# Patient Record
Sex: MALE | Race: ASIAN | NOT HISPANIC OR LATINO
[De-identification: names, ages, dates, MRNs, and addresses within clinical notes are randomized per-mention and may not be internally consistent; named-entity substitution may affect disease eponyms.]

---

## 2020-07-20 ENCOUNTER — APPOINTMENT (OUTPATIENT)
Dept: DERMATOLOGY | Facility: CLINIC | Age: 30
End: 2020-07-20

## 2020-07-20 PROBLEM — Z00.00 ENCOUNTER FOR PREVENTIVE HEALTH EXAMINATION: Status: ACTIVE | Noted: 2020-07-20

## 2020-11-05 ENCOUNTER — APPOINTMENT (OUTPATIENT)
Dept: PEDIATRIC ALLERGY IMMUNOLOGY | Facility: CLINIC | Age: 30
End: 2020-11-05
Payer: COMMERCIAL

## 2020-11-05 VITALS
DIASTOLIC BLOOD PRESSURE: 82 MMHG | SYSTOLIC BLOOD PRESSURE: 124 MMHG | HEIGHT: 69 IN | HEART RATE: 95 BPM | OXYGEN SATURATION: 98 % | WEIGHT: 182 LBS | BODY MASS INDEX: 26.96 KG/M2 | TEMPERATURE: 97.6 F

## 2020-11-05 DIAGNOSIS — R09.82 POSTNASAL DRIP: ICD-10-CM

## 2020-11-05 DIAGNOSIS — H10.10 ACUTE ATOPIC CONJUNCTIVITIS, UNSPECIFIED EYE: ICD-10-CM

## 2020-11-05 PROCEDURE — 99204 OFFICE O/P NEW MOD 45 MIN: CPT | Mod: 25

## 2020-11-05 PROCEDURE — 95004 PERQ TESTS W/ALRGNC XTRCS: CPT

## 2020-11-05 RX ORDER — AZELASTINE HYDROCHLORIDE 137 UG/1
0.1 SPRAY, METERED NASAL TWICE DAILY
Qty: 1 | Refills: 3 | Status: ACTIVE | COMMUNITY
Start: 2020-11-05 | End: 1900-01-01

## 2020-11-05 RX ORDER — FLUTICASONE PROPIONATE 50 UG/1
50 SPRAY, METERED NASAL
Qty: 1 | Refills: 1 | Status: ACTIVE | COMMUNITY
Start: 2020-11-05 | End: 1900-01-01

## 2020-11-06 PROBLEM — H10.10 ALLERGIC CONJUNCTIVITIS: Status: ACTIVE | Noted: 2020-11-06

## 2020-11-06 NOTE — HISTORY OF PRESENT ILLNESS
[de-identified] : 29 year old male presents with chronic rhinitis, postnasal drips, sometime itching of the eyes:\par \par Patient complaints of nasal congestion, rhinorrhea, sneezing, postnasal drip, itching of the throat and itching inside his ears for one year. During the summer season also itching of the eyes. He has tried Fluticasone and  Allegra with limited and temporary relief of symptoms. Report h/o drowsiness with Cetirizine.

## 2020-11-06 NOTE — PHYSICAL EXAM
[Alert] : alert [Well Nourished] : well nourished [Healthy Appearance] : healthy appearance [No Acute Distress] : no acute distress [Well Developed] : well developed [Normal Pupil & Iris Size/Symmetry] : normal pupil and iris size and symmetry [No Discharge] : no discharge [No Photophobia] : no photophobia [Sclera Not Icteric] : sclera not icteric [Normal Outer Ear/Nose] : the ears and nose were normal in appearance [Supple] : the neck was supple [Normal Rate and Effort] : normal respiratory rhythm and effort [No Crackles] : no crackles [No Retractions] : no retractions [Bilateral Audible Breath Sounds] : bilateral audible breath sounds [Normal Cervical Lymph Nodes] : cervical [Skin Intact] : skin intact  [No Rash] : no rash [No Skin Lesions] : no skin lesions [No Cyanosis] : no cyanosis [Normal Mood] : mood was normal [Normal Affect] : affect was normal [Alert, Awake, Oriented as Age-Appropriate] : alert, awake, oriented as age appropriate [Wheezing] : no wheezing was heard [Eczematous Patches] : no eczematous patches

## 2020-11-06 NOTE — REASON FOR VISIT
[Initial Consultation] : an initial consultation for [FreeTextEntry2] : chronic rhinitis, postnasal drip, itchy eyes

## 2020-11-06 NOTE — REVIEW OF SYSTEMS
[Nl] : Genitourinary [Immunizations are up to date] : Immunizations are up to date [Received Influenza Vaccine this Past Year] : patient has received the Influenza vaccine this past year [Eye Itching] : itchy eyes [Rhinorrhea] : rhinorrhea [Nasal Congestion] : nasal congestion [Throat Itching] : throat itching [Post Nasal Drip] : post nasal drip

## 2020-11-06 NOTE — CONSULT LETTER
[Dear  ___] : Dear  [unfilled], [Consult Letter:] : I had the pleasure of evaluating your patient, [unfilled]. [Please see my note below.] : Please see my note below. [Consult Closing:] : Thank you very much for allowing me to participate in the care of this patient.  If you have any questions, please do not hesitate to contact me. [Sincerely,] : Sincerely, [FreeTextEntry2] : Dr. VERONA COLMENARES, [FreeTextEntry3] : Lorin Thapa MD\par Attending Physician, Division of Allergy and Immunology\par , Departments of Medicine and Pediatrics\par [employer] [facility]\par [facility] \par [facility]

## 2021-01-26 ENCOUNTER — APPOINTMENT (OUTPATIENT)
Dept: NEUROSURGERY | Facility: CLINIC | Age: 31
End: 2021-01-26

## 2021-02-05 ENCOUNTER — APPOINTMENT (OUTPATIENT)
Dept: NEUROSURGERY | Facility: CLINIC | Age: 31
End: 2021-02-05
Payer: COMMERCIAL

## 2021-02-05 ENCOUNTER — TRANSCRIPTION ENCOUNTER (OUTPATIENT)
Age: 31
End: 2021-02-05

## 2021-02-05 VITALS
DIASTOLIC BLOOD PRESSURE: 87 MMHG | BODY MASS INDEX: 26.96 KG/M2 | HEIGHT: 69 IN | WEIGHT: 182 LBS | HEART RATE: 99 BPM | SYSTOLIC BLOOD PRESSURE: 135 MMHG

## 2021-02-05 PROCEDURE — 99204 OFFICE O/P NEW MOD 45 MIN: CPT

## 2021-02-05 PROCEDURE — 99072 ADDL SUPL MATRL&STAF TM PHE: CPT

## 2021-02-05 NOTE — ASSESSMENT
[FreeTextEntry1] : 30 year old male with neck pain, shoulder pain, and cervical radicular pain.  Given the nature of his complaints, I do believe MRI cervical spine is warranted to help delineate the exact etiology of his pain.  He will return to see me once he has completed the study so that we may review the results and discuss his further treatment options.

## 2021-02-05 NOTE — HISTORY OF PRESENT ILLNESS
[Neck] : neck [___ mths] : [unfilled] month(s) ago [2] : a current pain level of 2/10 [7] : a maximum pain level of 7/10 [Sharp] : sharp [Left] : left [Ulnar] : ulnar aspect of the  [3] : third digit [4] : fourth digit [5] : fifth digit [Numbness] : numbness [Insomnia] : insomnia [Medications] : medications [FreeTextEntry2] : from left side of neck to the shoulder [FreeTextEntry3] : weight-lifting, looking down on the cell phone [FreeTextEntry4] : straightening the neck [FreeTextEntry6] : Naproxen

## 2021-02-05 NOTE — PHYSICAL EXAM
[General Appearance - Alert] : alert [Mood] : the mood was normal [Motor Strength] : muscle strength was normal in all four extremities [Sensation Tactile Decrease] : light touch was intact [2+] : Brachioradialis left 2+ [Spurling's - Opposite Side] : Negative Spurling's on opposite side [Spurling's Same Side] : Negative Spurling's on same side [No Spinal Tenderness] : no spinal tenderness [] : no rash

## 2021-02-09 ENCOUNTER — APPOINTMENT (OUTPATIENT)
Dept: NEUROLOGY | Facility: CLINIC | Age: 31
End: 2021-02-09

## 2021-06-09 ENCOUNTER — NON-APPOINTMENT (OUTPATIENT)
Age: 31
End: 2021-06-09

## 2021-06-28 ENCOUNTER — NON-APPOINTMENT (OUTPATIENT)
Age: 31
End: 2021-06-28

## 2021-06-29 ENCOUNTER — APPOINTMENT (OUTPATIENT)
Dept: OPHTHALMOLOGY | Facility: CLINIC | Age: 31
End: 2021-06-29
Payer: COMMERCIAL

## 2021-06-29 ENCOUNTER — NON-APPOINTMENT (OUTPATIENT)
Age: 31
End: 2021-06-29

## 2021-06-29 PROCEDURE — 92004 COMPRE OPH EXAM NEW PT 1/>: CPT

## 2021-12-09 ENCOUNTER — APPOINTMENT (OUTPATIENT)
Dept: NEUROLOGY | Facility: CLINIC | Age: 31
End: 2021-12-09
Payer: COMMERCIAL

## 2021-12-09 VITALS
HEIGHT: 69 IN | SYSTOLIC BLOOD PRESSURE: 124 MMHG | HEART RATE: 98 BPM | WEIGHT: 180 LBS | BODY MASS INDEX: 26.66 KG/M2 | DIASTOLIC BLOOD PRESSURE: 76 MMHG

## 2021-12-09 DIAGNOSIS — Z83.3 FAMILY HISTORY OF DIABETES MELLITUS: ICD-10-CM

## 2021-12-09 DIAGNOSIS — M54.12 RADICULOPATHY, CERVICAL REGION: ICD-10-CM

## 2021-12-09 DIAGNOSIS — M54.2 CERVICALGIA: ICD-10-CM

## 2021-12-09 DIAGNOSIS — Z78.9 OTHER SPECIFIED HEALTH STATUS: ICD-10-CM

## 2021-12-09 PROCEDURE — 99204 OFFICE O/P NEW MOD 45 MIN: CPT

## 2021-12-09 NOTE — ASSESSMENT
[FreeTextEntry1] : The patient is a nearly 30-year-old, right-handed, nonhypertensive, nondiabetic man with an approximately 1 year history of left-sided neck, shoulder and arm pain.  He did not notice any precipitating event.  The pain radiates from the left side of the neck to the scapula and ulnar side of the arm, occasionally extending to the fifth finger.  It is made worse by head turning and head tilting.  If he is fully at rest, the pain can disappear.  It is currently 0/10 but goes to 5/10.  He has not noticed any weakness.  There have been no bowel or bladder problems and there has been no change in weight.  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  He was seen in consultation by Dr. Hemphill, but the MRI scan of the neck was not done.\par The general physical examination shows only some limited.  However Spurling's maneuver is negative.  Head tilt does not reproduce his pain.  The neurological examination is normal.  However, with clear radicular symptoms and a history of biopsy-proven sarcoidosis, I am obliged to get an imaging procedure of the neck.  I will order an MRI with contrast to rule out meningeal invasion by sarcoid.  I will also prescribe physical therapy.

## 2021-12-09 NOTE — PHYSICAL EXAM
[General Appearance - Alert] : alert [General Appearance - In No Acute Distress] : in no acute distress [Oriented To Time, Place, And Person] : oriented to person, place, and time [Impaired Insight] : insight and judgment were intact [Affect] : the affect was normal [Person] : oriented to person [Place] : oriented to place [Time] : oriented to time [Concentration Intact] : normal concentrating ability [Visual Intact] : visual attention was ~T not ~L decreased [Naming Objects] : no difficulty naming common objects [Repeating Phrases] : no difficulty repeating a phrase [Writing A Sentence] : no difficulty writing a sentence [Fluency] : fluency intact [Comprehension] : comprehension intact [Reading] : reading intact [Past History] : adequate knowledge of personal past history [Cranial Nerves Optic (II)] : visual acuity intact bilaterally,  visual fields full to confrontation, pupils equal round and reactive to light [Cranial Nerves Oculomotor (III)] : extraocular motion intact [Cranial Nerves Trigeminal (V)] : facial sensation intact symmetrically [Cranial Nerves Facial (VII)] : face symmetrical [Cranial Nerves Vestibulocochlear (VIII)] : hearing was intact bilaterally [Cranial Nerves Glossopharyngeal (IX)] : tongue and palate midline [Cranial Nerves Accessory (XI - Cranial And Spinal)] : head turning and shoulder shrug symmetric [Cranial Nerves Hypoglossal (XII)] : there was no tongue deviation with protrusion [Motor Strength] : muscle strength was normal in all four extremities [No Muscle Atrophy] : normal bulk in all four extremities [Sensation Tactile Decrease] : light touch was intact [Balance] : balance was intact [2+] : Ankle jerk left 2+ [Sclera] : the sclera and conjunctiva were normal [PERRL With Normal Accommodation] : pupils were equal in size, round, reactive to light, with normal accommodation [Extraocular Movements] : extraocular movements were intact [Outer Ear] : the ears and nose were normal in appearance [Oropharynx] : the oropharynx was normal [Neck Appearance] : the appearance of the neck was normal [Neck Cervical Mass (___cm)] : no neck mass was observed [Jugular Venous Distention Increased] : there was no jugular-venous distention [Thyroid Diffuse Enlargement] : the thyroid was not enlarged [Thyroid Nodule] : there were no palpable thyroid nodules [Auscultation Breath Sounds / Voice Sounds] : lungs were clear to auscultation bilaterally [Heart Rate And Rhythm] : heart rate was normal and rhythm regular [Heart Sounds] : normal S1 and S2 [Heart Sounds Gallop] : no gallops [Murmurs] : no murmurs [Heart Sounds Pericardial Friction Rub] : no pericardial rub [Full Pulse] : the pedal pulses are present [Edema] : there was no peripheral edema [No CVA Tenderness] : no ~M costovertebral angle tenderness [No Spinal Tenderness] : no spinal tenderness [Abnormal Walk] : normal gait [Nail Clubbing] : no clubbing  or cyanosis of the fingernails [Musculoskeletal - Swelling] : no joint swelling seen [Motor Tone] : muscle strength and tone were normal [Skin Color & Pigmentation] : normal skin color and pigmentation [Skin Turgor] : normal skin turgor [] : no rash [Past-pointing] : there was no past-pointing [Tremor] : no tremor present [Plantar Reflex Right Only] : normal on the right [Plantar Reflex Left Only] : normal on the left [FreeTextEntry1] : There is some limitation of range of motion of the neck.

## 2021-12-09 NOTE — HISTORY OF PRESENT ILLNESS
[FreeTextEntry1] : The patient is a nearly 30-year-old, right-handed, nonhypertensive, nondiabetic man with an approximately 1 year history of left-sided neck, shoulder and arm pain.  He did not notice any precipitating event.  The pain radiates from the left side of the neck to the scapula and ulnar side of the arm, occasionally extending to the fifth finger.  It is made worse by head turning and head tilting.  If he is fully at rest, the pain can disappear.  It is currently 0/10 but goes to 5/10.  He has not noticed any weakness.  There have been no bowel or bladder problems and there has been no change in weight.  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  He was seen in consultation by Dr. Hemphill, but the MRI scan of the neck was not done.

## 2022-01-04 ENCOUNTER — APPOINTMENT (OUTPATIENT)
Dept: MRI IMAGING | Facility: CLINIC | Age: 32
End: 2022-01-04

## 2022-03-15 ENCOUNTER — APPOINTMENT (OUTPATIENT)
Dept: DERMATOLOGY | Facility: CLINIC | Age: 32
End: 2022-03-15
Payer: COMMERCIAL

## 2022-03-15 VITALS — WEIGHT: 170 LBS | BODY MASS INDEX: 25.18 KG/M2 | HEIGHT: 69 IN

## 2022-03-15 PROCEDURE — 17110 DESTRUCTION B9 LES UP TO 14: CPT | Mod: GC

## 2022-03-15 PROCEDURE — 99204 OFFICE O/P NEW MOD 45 MIN: CPT | Mod: GC,25

## 2022-04-07 ENCOUNTER — APPOINTMENT (OUTPATIENT)
Dept: NEUROLOGY | Facility: CLINIC | Age: 32
End: 2022-04-07

## 2022-04-22 ENCOUNTER — APPOINTMENT (OUTPATIENT)
Dept: DERMATOLOGY | Facility: CLINIC | Age: 32
End: 2022-04-22
Payer: COMMERCIAL

## 2022-04-22 DIAGNOSIS — L30.9 DERMATITIS, UNSPECIFIED: ICD-10-CM

## 2022-04-22 PROCEDURE — 17110 DESTRUCTION B9 LES UP TO 14: CPT

## 2022-04-22 PROCEDURE — 99213 OFFICE O/P EST LOW 20 MIN: CPT | Mod: 25

## 2022-06-10 ENCOUNTER — APPOINTMENT (OUTPATIENT)
Dept: OTOLARYNGOLOGY | Facility: CLINIC | Age: 32
End: 2022-06-10
Payer: COMMERCIAL

## 2022-06-10 ENCOUNTER — APPOINTMENT (OUTPATIENT)
Dept: DERMATOLOGY | Facility: CLINIC | Age: 32
End: 2022-06-10
Payer: COMMERCIAL

## 2022-06-10 VITALS
TEMPERATURE: 97.6 F | WEIGHT: 177 LBS | DIASTOLIC BLOOD PRESSURE: 64 MMHG | SYSTOLIC BLOOD PRESSURE: 118 MMHG | HEART RATE: 98 BPM | OXYGEN SATURATION: 98 % | HEIGHT: 69 IN | BODY MASS INDEX: 26.22 KG/M2 | RESPIRATION RATE: 15 BRPM

## 2022-06-10 DIAGNOSIS — J02.9 ACUTE PHARYNGITIS, UNSPECIFIED: ICD-10-CM

## 2022-06-10 DIAGNOSIS — J39.2 OTHER DISEASES OF PHARYNX: ICD-10-CM

## 2022-06-10 DIAGNOSIS — R05.9 COUGH, UNSPECIFIED: ICD-10-CM

## 2022-06-10 DIAGNOSIS — K21.9 GASTRO-ESOPHAGEAL REFLUX DISEASE W/OUT ESOPHAGITIS: ICD-10-CM

## 2022-06-10 DIAGNOSIS — J30.9 ALLERGIC RHINITIS, UNSPECIFIED: ICD-10-CM

## 2022-06-10 PROCEDURE — 17110 DESTRUCTION B9 LES UP TO 14: CPT

## 2022-06-10 PROCEDURE — 31575 DIAGNOSTIC LARYNGOSCOPY: CPT

## 2022-06-10 PROCEDURE — 99204 OFFICE O/P NEW MOD 45 MIN: CPT | Mod: 25

## 2022-06-10 PROCEDURE — 99212 OFFICE O/P EST SF 10 MIN: CPT | Mod: 25

## 2022-06-10 RX ORDER — FAMOTIDINE 20 MG/1
20 TABLET, FILM COATED ORAL
Refills: 0 | Status: ACTIVE | COMMUNITY

## 2022-06-10 RX ORDER — MONTELUKAST 10 MG/1
10 TABLET, FILM COATED ORAL DAILY
Qty: 30 | Refills: 5 | Status: ACTIVE | COMMUNITY
Start: 2022-06-10 | End: 1900-01-01

## 2022-06-10 RX ORDER — KETOTIFEN FUMARATE 0.25 MG/ML
0.03 SOLUTION/ DROPS OPHTHALMIC
Qty: 1 | Refills: 2 | Status: DISCONTINUED | COMMUNITY
Start: 2020-11-06 | End: 2022-06-10

## 2022-06-10 RX ORDER — FEXOFENADINE HYDROCHLORIDE 180 MG/1
180 TABLET ORAL
Qty: 1 | Refills: 1 | Status: DISCONTINUED | COMMUNITY
End: 2022-06-10

## 2022-06-10 RX ORDER — HYDROXYZINE HYDROCHLORIDE 50 MG/1
TABLET ORAL
Refills: 0 | Status: ACTIVE | COMMUNITY

## 2022-06-10 NOTE — ASSESSMENT
[FreeTextEntry1] : Pt's symptoms are most likely related to his allergies.  Reflux may also be playing a role.  \par \par Trial of Montelukast recommended. \par \par Reflux precautions and behavioral modifications were discussed with patient including weight loss, the avoidance of caffeine, alcohol and tobacco ingestion, avoidance of eating within 3 hours of retiring for sleep, and elevation of the head of the bed.\par

## 2022-06-10 NOTE — PROCEDURE
[Unable to Cooperate with Mirror] : patient unable to cooperate with mirror [Complicated Symptoms] : complicated symptoms requiring more thorough examination than provided by mirror [Topical Lidocaine] : topical lidocaine [Oxymetazoline HCl] : oxymetazoline HCl [Flexible Endoscope] : examined with the flexible endoscope [Serial Number: ___] : Serial Number: [unfilled] [Normal] : the false vocal folds were pink and regular, the ventricular sulcus was open, the true vocal folds were glistening white, tense and of equal length, mobility, and height [True Vocal Cords Paralysis] : no true vocal cord paralysis [True Vocal Cords Erythematous] : no true vocal cord edema [True Vocal Cords Sewell's Nodules] : no true vocal cord nodules [Glottis Arytenoid Cartilages] : no arytenoid granulomas [Glottis Arytenoid Cartilages Erythema] : no arytenoid erythema [Interarytenoid Edema] : interarytenoid area edematous [FreeTextEntry9] : + Posterior cobblestoning

## 2022-06-10 NOTE — CONSULT LETTER
[Dear  ___] : Dear  [unfilled], [Consult Letter:] : I had the pleasure of evaluating your patient, [unfilled]. [Please see my note below.] : Please see my note below. [Consult Closing:] : Thank you very much for allowing me to participate in the care of this patient.  If you have any questions, please do not hesitate to contact me. [Sincerely,] : Sincerely, [FreeTextEntry2] : Bassam Cook MD ([place], [place])\par  [FreeTextEntry3] : Barrie Esposito MD, FACS\par Chief of Otolaryngology [facility]\par  - Dept. of Otolaryngology\par [employer]\par \par  [DrFlorida  ___] : Dr. WELDON

## 2022-06-10 NOTE — PHYSICAL EXAM
[Midline] : trachea located in midline position [Normal] : no rashes [de-identified] : +excessive cerumen L>R - removed [de-identified] : Pale and edematous [Laryngoscopy Performed] : laryngoscopy was performed, see procedure section for findings [de-identified] : + Cobblestoning

## 2022-06-10 NOTE — HISTORY OF PRESENT ILLNESS
[de-identified] : Mr. DE LA TORRE is a 31 year male with a known h/o allergy to oak trees, who presents with allergy symptoms - sneezing, congestion, watery eyes, since May.   Since last week, he has been feeling increased post nasal drainage.  He also reports that recently, he has increased itching in his throat, cough and globus sensation with frequent throat clearing.  He has been using Flonase the past month with some relief.\par \par He has a history sarcoidosis diagnosed 2 years.  He saw an allergist in 2022.\par \par Pt also reports recent hemoptysis in the morning.  he describes small amounts of blood that he spits up.   [Neck Mass] : no neck mass [Difficulty Swallowing] : no difficulty swallowing [Painful Swallowing] : no painful swallowing

## 2022-06-10 NOTE — REVIEW OF SYSTEMS
[Negative] : Heme/Lymph [Sneezing] : sneezing [Seasonal Allergies] : seasonal allergies [Post Nasal Drip] : post nasal drip [Nasal Congestion] : nasal congestion [Throat Clearing] : throat clearing [Throat Dryness] : throat dryness [Throat Itching] : throat itching [Eyes Itch] : itching of the eyes [Cough] : cough [Heartburn] : heartburn [Nose Bleeds] : no nose bleeds [Recurrent Sinus Infections] : no recurrent sinus infections [Problem Snoring] : no snoring problems [Sinus Pain] : no sinus pain [Sinus Pressure] : no sinus pressure [Sense Of Smell Problem] : no sense of smell problem [Discolored Nasal Discharge] : no discolored nasal discharge [Snoring With Pauses] : no snoring with pauses [Hoarseness] : no hoarseness [Throat Pain] : no throat pain [Eye Pain] : no eye pain [Red  Eyes] : eyes not red [Eyesight Problems] : no eyesight problems [Discharge From Eyes] : no purulent discharge from the eyes [Dry Eyes] : no dryness of the eyes [Shortness Of Breath] : no shortness of breath [Wheezing] : no wheezing [SOB on Exertion] : no shortness of breath during exertion [Orthopnea] : no orthopnea [PND] : no PND [Abdominal Pain] : no abdominal pain [Vomiting] : no vomiting [Constipation] : no constipation [Diarrhea] : no diarrhea [Melena] : no melena [FreeTextEntry3] : Watery eyes [de-identified] : Headaches

## 2022-08-05 ENCOUNTER — NON-APPOINTMENT (OUTPATIENT)
Age: 32
End: 2022-08-05

## 2022-08-05 ENCOUNTER — APPOINTMENT (OUTPATIENT)
Dept: DERMATOLOGY | Facility: CLINIC | Age: 32
End: 2022-08-05

## 2022-08-05 VITALS — BODY MASS INDEX: 25.18 KG/M2 | WEIGHT: 170 LBS | HEIGHT: 69 IN

## 2022-08-05 DIAGNOSIS — L81.0 POSTINFLAMMATORY HYPERPIGMENTATION: ICD-10-CM

## 2022-08-05 PROCEDURE — 99212 OFFICE O/P EST SF 10 MIN: CPT | Mod: 25

## 2022-08-05 PROCEDURE — 17110 DESTRUCTION B9 LES UP TO 14: CPT

## 2022-08-05 RX ORDER — HYDROCORTISONE 25 MG/G
2.5 OINTMENT TOPICAL
Qty: 1 | Refills: 2 | Status: ACTIVE | COMMUNITY
Start: 2022-03-15 | End: 1900-01-01

## 2022-09-06 ENCOUNTER — APPOINTMENT (OUTPATIENT)
Dept: DERMATOLOGY | Facility: CLINIC | Age: 32
End: 2022-09-06

## 2022-11-15 ENCOUNTER — APPOINTMENT (OUTPATIENT)
Dept: PHYSICAL MEDICINE AND REHAB | Facility: CLINIC | Age: 32
End: 2022-11-15

## 2022-11-15 VITALS
HEART RATE: 93 BPM | BODY MASS INDEX: 25.48 KG/M2 | HEIGHT: 69 IN | WEIGHT: 172 LBS | DIASTOLIC BLOOD PRESSURE: 83 MMHG | SYSTOLIC BLOOD PRESSURE: 126 MMHG | OXYGEN SATURATION: 98 %

## 2022-11-15 PROCEDURE — 99214 OFFICE O/P EST MOD 30 MIN: CPT

## 2022-11-15 PROCEDURE — 99204 OFFICE O/P NEW MOD 45 MIN: CPT

## 2022-11-15 RX ORDER — METHYLPREDNISOLONE 4 MG/1
4 TABLET ORAL
Qty: 1 | Refills: 0 | Status: ACTIVE | COMMUNITY
Start: 2022-11-15 | End: 1900-01-01

## 2022-11-15 NOTE — ASSESSMENT
[FreeTextEntry1] : Mr. HARVINDER DE LA TORRE is a 31 year old male who presents with right sided low back/buttock pain, with some radiation down his RLE, consistent with a lumbar radiculopathy/discogenic pain. Denies any red flag signs. Will recommend:\par - Medrol dose vandana Rx given, to take as recommended. Patient advised on potential side effects including but not limited to increased risk of elevated blood sugar, blood pressure, and infection. Patient encouraged to take medication with food and not with NSAIDs. \par - Continue PT 2-3x/week for stretching, strengthening (especially of core muscles), ROM exercises, HEP and modalities PRN including myofascial release, moist heat \par - Will order MRI L Spine given persistent of pain for over 6 weeks despite HEP and PT. \par \par RTC after MRI. Patient aware of red flag signs including any changes to their bowel/bladder control, groin numbness or new weakness. Patient knows to seek immediate attention by calling 911 or going to nearest ER if these symptoms appear.

## 2022-11-15 NOTE — PHYSICAL EXAM
[FreeTextEntry1] : PE:\par Constitutional: In NAD, calm and cooperative\par MSK (Back)\par 	Inspection: no gross swelling identified\par 	Palpation: Tenderness of the right lower lumbar paraspinals\par 	ROM: Pain at 30 degrees lumbar flexion, no pain with extension\par 	Strength: 5/5 strength in bilateral lower extremities\par 	Reflexes: 2+ Patella reflex bilaterally, 2+ Achilles reflex bilaterally, negative clonus bilaterally\par 	Sensation: Intact to light touch in bilateral lower extremities\par Special tests:\par SLR:equivocal on right, negative on left\par CORY:negative bilaterally\par FADIR: negative bilaterally\par Facet loading: negative bilaterally

## 2022-11-15 NOTE — HISTORY OF PRESENT ILLNESS
[FreeTextEntry1] : Mr. HARVINDER DE LA TORRE is a 31 year old male with PMHx of Sarcoidosis who presents with low back pain. \par \par Location:R low back, R buttock area\par Onset:Started in 8/2022, was getting out of car but it was mild. Pain however persisted, and now gradually getting worse. \par Provocation/Palliative:Worse with prolonged sitting, sometimes better with walking\par Quality:Shooting\par Radiation:Goes down RLE to ankle\par Severity:6/10\par Timing:Gradually getting worse\par \par Denies any associated numbness. Denies any associated leg weakness. Denies any loss of bowel/bladder control or any groin numbness.\par Previous medications trialed:Has tried Ibuprofen and Baclofen wit only mild relief\par Previous procedures relevant to complaint:None\par Conservative therapy tried?:Has completed 10-11 sessions physical therapy with some improvement but persistent pain

## 2022-12-02 ENCOUNTER — OUTPATIENT (OUTPATIENT)
Dept: OUTPATIENT SERVICES | Facility: HOSPITAL | Age: 32
LOS: 1 days | End: 2022-12-02
Payer: COMMERCIAL

## 2022-12-02 ENCOUNTER — APPOINTMENT (OUTPATIENT)
Dept: MRI IMAGING | Facility: CLINIC | Age: 32
End: 2022-12-02

## 2022-12-02 DIAGNOSIS — M54.59 OTHER LOW BACK PAIN: ICD-10-CM

## 2022-12-02 PROCEDURE — 72148 MRI LUMBAR SPINE W/O DYE: CPT

## 2022-12-02 PROCEDURE — 72148 MRI LUMBAR SPINE W/O DYE: CPT | Mod: 26

## 2022-12-08 ENCOUNTER — APPOINTMENT (OUTPATIENT)
Dept: PHYSICAL MEDICINE AND REHAB | Facility: CLINIC | Age: 32
End: 2022-12-08

## 2022-12-08 VITALS
HEIGHT: 69 IN | RESPIRATION RATE: 14 BRPM | WEIGHT: 172 LBS | DIASTOLIC BLOOD PRESSURE: 80 MMHG | BODY MASS INDEX: 25.48 KG/M2 | HEART RATE: 103 BPM | SYSTOLIC BLOOD PRESSURE: 115 MMHG

## 2022-12-08 DIAGNOSIS — M54.59 OTHER LOW BACK PAIN: ICD-10-CM

## 2022-12-08 DIAGNOSIS — M54.16 RADICULOPATHY, LUMBAR REGION: ICD-10-CM

## 2022-12-08 PROCEDURE — 99213 OFFICE O/P EST LOW 20 MIN: CPT

## 2022-12-08 NOTE — ASSESSMENT
[FreeTextEntry1] : Mr. HARVINDER DE LA TORRE is a 31 year old male who presents with right sided low back/buttock pain, with some radiation down his RLE, consistent with a lumbar radiculopathy/discogenic pain. MRI showed L5-S1 disc herniation. Denies any red flag signs. Will recommend:\par - Continue PT 2-3x/week with transition to HEP for stretching, strengthening (especially of core muscles), ROM exercises, HEP and modalities PRN including myofascial release, moist heat \par - Continue Ibuprofen PRN, has not needed it lately. \par - Discussed with patient the risks (including but not limited to bleeding, infection, nerve damage, etc), benefits and alternatives to an epidural steroid injection for which patient understands. Will hold off for now as patient improving. \par \par RTC as needed. Patient aware of red flag signs including any changes to their bowel/bladder control, groin numbness or new weakness. Patient knows to seek immediate attention by calling 911 or going to nearest ER if these symptoms appear.

## 2022-12-08 NOTE — PHYSICAL EXAM
[FreeTextEntry1] : PE:\par Constitutional: In NAD, calm and cooperative\par MSK (Back)\par 	Inspection: no gross swelling identified\par 	Palpation: No TTP of the right lower lumbar paraspinals\par 	ROM: Pain at 50 degrees lumbar flexion, no pain with extension\par 	Strength: 5/5 strength in bilateral lower extremities\par 	Reflexes: 2+ Patella reflex bilaterally, 2+ Achilles reflex bilaterally, negative clonus bilaterally\par 	Sensation: Intact to light touch in bilateral lower extremities\par Special tests:\par SLR:equivocal on right, negative on left\par CORY:negative bilaterally\par FADIR: negative bilaterally\par Facet loading: negative bilaterally

## 2022-12-08 NOTE — HISTORY OF PRESENT ILLNESS
[FreeTextEntry1] : Mr. HARVINDER DE LA TORRE is a 31 year old  male who presents for follow up. At last visit, he was given a medrol dose vandana, continued on PT and ordered an MRI L Spine. Overall he feels better with PT and the steroid pack. Denies any new symptoms. He has been to 10 sessions of PT with relief.\par \par Location:R low back, R buttock area\par Onset:Started in 8/2022, was getting out of car but it was mild. Pain however persisted.\par Provocation/Palliative:Worse with prolonged sitting, sometimes better with walking\par Quality:Shooting\par Radiation:Goes down RLE to ankle\par Severity:3/10\par Timing:Gradually getting better. \par \par No bowel/bladder changes. No groin numbness.  No

## 2022-12-08 NOTE — DATA REVIEWED
[FreeTextEntry1] : \par   MR Lumbar Spine No Cont             Final\par \par No Documents Attached\par \par \par \par \par   EXAM: [id number] - MR SPINE LUMBAR  - ORDERED BY: KIMBERLEY CARVAJAL\par \par \par PROCEDURE DATE:  12/02/2022\par \par \par \par INTERPRETATION:  CLINICAL INFORMATION: Low back for 3 months going to the right lower extremity. Sarcoidosis.\par \par ADDITIONAL CLINICAL INFORMATION: Low back muscle strain S39.012A\par \par TECHNIQUE: Multiplanar, multisequence MRI was performed of the lumbar spine.\par IV Contrast:\par \par PRIOR STUDIES: No priors available for comparison.\par \par FINDINGS:\par \par BONES: There is no fracture or bone marrow edema.\par ALIGNMENT: The alignment is normal.\par SACROILIAC JOINTS/SACRUM: There is no sacral fracture. The SI joints are partially visualized but are intact.\par CONUS AND CAUDA EQUINA: The distal cord and conus are normal in signal. Conus terminates at L1-L2..\par VISUALIZED INTRAPELVIC/INTRA-ABDOMINAL SOFT TISSUES: Normal.\par PARASPINAL SOFT TISSUES: Normal.\par \par \par INDIVIDUAL LEVELS:\par \par LOWER THORACIC SPINE: No spinal canal or neuroforaminal stenosis.\par \par L1-L2: No spinal canal or neuroforaminal stenosis.\par L2-L3: No spinal canal or neuroforaminal stenosis.\par L3-L4: No spinal canal or neuroforaminal stenosis.\par L4-L5: No spinal canal or neuroforaminal stenosis.\par L5-S1: Mild central disc protrusion which contacts both descending S1 nerve roots and pushes the left descending S1 nerve root minimally posteriorly. There is no significant foraminal narrowing or spinal canal stenosis.\par \par \par IMPRESSION:\par \par Mild central disc protrusion at L5-S1 which contacts both descending S1 nerve roots with the left descending S1 nerve root pushed minimally posteriorly.\par \par --- End of Report ---\par \par \par \par \par \par \par MOE KAMARA MD; Attending Radiologist\par This document has been electronically signed. Dec  2 2022  8:45PM\par \par  \par \par  Ordered by: KIMBERLEY CARVAJAL       Collected/Examined: [id number] 08:26PM       \par Verified by: KIMBERLEY CARVAJAL [id number] 10:05AM       \par  Result Communication: Discussed results with patient;\par Stage: Final       \par  Performed at: [facility]       Resulted: [id number] 08:41PM       Last Updated: 06Dec2022 10:05AM       Accession: [id number]

## 2023-03-02 ENCOUNTER — APPOINTMENT (OUTPATIENT)
Dept: DERMATOLOGY | Facility: CLINIC | Age: 33
End: 2023-03-02

## 2023-04-04 ENCOUNTER — APPOINTMENT (OUTPATIENT)
Dept: DERMATOLOGY | Facility: CLINIC | Age: 33
End: 2023-04-04
Payer: COMMERCIAL

## 2023-04-04 PROCEDURE — 99214 OFFICE O/P EST MOD 30 MIN: CPT | Mod: 25

## 2023-04-04 PROCEDURE — 17111 DESTRUCTION B9 LESIONS 15/>: CPT

## 2023-04-04 NOTE — HISTORY OF PRESENT ILLNESS
[FreeTextEntry1] : f/u VV [de-identified] : 31M here for f/u VV. LV 8/2022\par \par 1) VV - Since LV, has noticed more spots in the beard, L hand, and R scalp. Not using imiquimod\par

## 2023-04-04 NOTE — PHYSICAL EXAM
[Alert] : alert [Oriented x 3] : ~L oriented x 3 [Well Nourished] : well nourished [Conjunctiva Non-injected] : conjunctiva non-injected [No Visual Lymphadenopathy] : no visual  lymphadenopathy [No Clubbing] : no clubbing [No Edema] : no edema [No Bromhidrosis] : no bromhidrosis [No Chromhidrosis] : no chromhidrosis [FreeTextEntry3] : verrucous papules on the anterior neck, R and L cheek, L dorsal hand x 3, and R frontal scalp

## 2023-05-23 ENCOUNTER — APPOINTMENT (OUTPATIENT)
Dept: DERMATOLOGY | Facility: CLINIC | Age: 33
End: 2023-05-23

## 2023-06-13 ENCOUNTER — APPOINTMENT (OUTPATIENT)
Dept: DERMATOLOGY | Facility: CLINIC | Age: 33
End: 2023-06-13
Payer: COMMERCIAL

## 2023-06-13 PROCEDURE — 99214 OFFICE O/P EST MOD 30 MIN: CPT | Mod: 25

## 2023-06-13 PROCEDURE — 17110 DESTRUCTION B9 LES UP TO 14: CPT

## 2023-06-13 PROCEDURE — 99213 OFFICE O/P EST LOW 20 MIN: CPT | Mod: 25

## 2023-06-13 RX ORDER — IMIQUIMOD 50 MG/G
5 CREAM TOPICAL
Qty: 1 | Refills: 1 | Status: ACTIVE | COMMUNITY
Start: 2022-03-15 | End: 1900-01-01

## 2023-08-04 ENCOUNTER — APPOINTMENT (OUTPATIENT)
Dept: DERMATOLOGY | Facility: CLINIC | Age: 33
End: 2023-08-04
Payer: COMMERCIAL

## 2023-08-04 PROCEDURE — 17110 DESTRUCTION B9 LES UP TO 14: CPT

## 2023-08-04 PROCEDURE — 99214 OFFICE O/P EST MOD 30 MIN: CPT | Mod: 25

## 2023-08-04 RX ORDER — HYDROCORTISONE 25 MG/G
2.5 OINTMENT TOPICAL
Qty: 1 | Refills: 1 | Status: ACTIVE | COMMUNITY
Start: 2023-08-04 | End: 1900-01-01

## 2023-08-04 NOTE — ASSESSMENT
[FreeTextEntry1] : 1. Verruca vulgaris; Anterior and submental neck, L oral commissure   Lesions from LV improved, now with new lesions c/w imiquimod cream M/W/F. SED --Cryotherapy performed: Risks: erythema, blistering, dyspigmentation (hypo/hyper), scar, need for multiple treatment, persistence/recurrence. Lesion number: 5 #freeze-thaw cycles to each lesion: 2 (cryo to lesions near Oral comissure) Thaw time: 5s Wound care discussed  Advised to discuss HPV vaccine w/ PCP  If continues to develop new lesions, may consider sampling to confirm diagnosis   2. Eczema; flaring, initial encounter - For the affected areas on the body: hydrocortisone 2.5% ointment BID (avoidance of use on face, groin, skin folds) PRN for up to 2 weeks at a time, then 1 week off. Repeat cycles as needed. SED including but not limited to skin thinning. Importance of using sunscreen encouraged.   RTC 8 weeks

## 2023-08-04 NOTE — HISTORY OF PRESENT ILLNESS
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 LV 06/2023   1) VV - Since LV, has noticed more spots in the beard. Prev notes lesions on neck. Using imiquimod TIW, but has not used imiquimod to new lesions   2) intermittent itchy patch on chest x months; also reports itchy areas on ACF. Denies use of cologne. Moisturizes with eucerin daily Has not had HPV vaccine

## 2023-08-04 NOTE — PHYSICAL EXAM
[Alert] : alert [Oriented x 3] : ~L oriented x 3 [Well Nourished] : well nourished [Conjunctiva Non-injected] : conjunctiva non-injected [No Visual Lymphadenopathy] : no visual  lymphadenopathy [No Clubbing] : no clubbing [No Edema] : no edema [No Bromhidrosis] : no bromhidrosis [No Chromhidrosis] : no chromhidrosis [FreeTextEntry3] : verrucous papules on the R oral commissure x 2, L oral commissure x 2, beard x 1  lesion on neck, L dorsal hand and scalp resolved

## 2023-09-12 ENCOUNTER — APPOINTMENT (OUTPATIENT)
Dept: DERMATOLOGY | Facility: CLINIC | Age: 33
End: 2023-09-12
Payer: COMMERCIAL

## 2023-09-12 DIAGNOSIS — L30.9 DERMATITIS, UNSPECIFIED: ICD-10-CM

## 2023-09-12 PROCEDURE — 17110 DESTRUCTION B9 LES UP TO 14: CPT

## 2023-09-12 PROCEDURE — 99213 OFFICE O/P EST LOW 20 MIN: CPT | Mod: 25

## 2023-09-29 ENCOUNTER — APPOINTMENT (OUTPATIENT)
Dept: OPHTHALMOLOGY | Facility: CLINIC | Age: 33
End: 2023-09-29

## 2023-10-01 PROBLEM — M54.16 LUMBAR RADICULAR PAIN: Status: ACTIVE | Noted: 2022-11-15

## 2023-10-02 ENCOUNTER — APPOINTMENT (OUTPATIENT)
Dept: PHYSICAL MEDICINE AND REHAB | Facility: CLINIC | Age: 33
End: 2023-10-02

## 2023-11-07 ENCOUNTER — APPOINTMENT (OUTPATIENT)
Dept: DERMATOLOGY | Facility: CLINIC | Age: 33
End: 2023-11-07
Payer: COMMERCIAL

## 2023-11-07 DIAGNOSIS — B07.9 VIRAL WART, UNSPECIFIED: ICD-10-CM

## 2023-11-07 PROCEDURE — 17110 DESTRUCTION B9 LES UP TO 14: CPT

## 2023-11-07 PROCEDURE — 99213 OFFICE O/P EST LOW 20 MIN: CPT | Mod: 25

## 2024-01-16 ENCOUNTER — APPOINTMENT (OUTPATIENT)
Dept: DERMATOLOGY | Facility: CLINIC | Age: 34
End: 2024-01-16

## 2024-02-05 ENCOUNTER — APPOINTMENT (OUTPATIENT)
Dept: PULMONOLOGY | Facility: CLINIC | Age: 34
End: 2024-02-05
Payer: COMMERCIAL

## 2024-02-05 DIAGNOSIS — R59.9 ENLARGED LYMPH NODES, UNSPECIFIED: ICD-10-CM

## 2024-02-05 DIAGNOSIS — D86.0 SARCOIDOSIS OF LUNG: ICD-10-CM

## 2024-02-05 PROCEDURE — 99205 OFFICE O/P NEW HI 60 MIN: CPT | Mod: 25

## 2024-02-05 PROCEDURE — 94729 DIFFUSING CAPACITY: CPT

## 2024-02-05 PROCEDURE — ZZZZZ: CPT

## 2024-02-05 PROCEDURE — 94726 PLETHYSMOGRAPHY LUNG VOLUMES: CPT

## 2024-02-05 PROCEDURE — 94060 EVALUATION OF WHEEZING: CPT

## 2024-02-05 NOTE — PHYSICAL EXAM
[No Acute Distress] : no acute distress [Well Nourished] : well nourished [Well Groomed] : well groomed [No Deformities] : no deformities [Well Developed] : well developed [Normal Oropharynx] : normal oropharynx [Normal Appearance] : normal appearance [Supple] : supple [No Neck Mass] : no neck mass [Normal Rate/Rhythm] : normal rate/rhythm [Normal S1, S2] : normal s1, s2 [No Resp Distress] : no resp distress [No Acc Muscle Use] : no acc muscle use [Normal Palpation] : normal palpation [Normal Rhythm and Effort] : normal rhythm and effort [Clear to Auscultation Bilaterally] : clear to auscultation bilaterally [Benign] : benign [Soft] : soft [Normal Gait] : normal gait [No Clubbing] : no clubbing [No Cyanosis] : no cyanosis [No Edema] : no edema [FROM] : FROM [Normal Color/ Pigmentation] : normal color/ pigmentation [No Focal Deficits] : no focal deficits [No Motor Deficits] : no motor deficits [Oriented x3] : oriented x3 [Normal Affect] : normal affect [TextBox_11] : NCAT, EOMI, anicteric, MMM, no thrush

## 2024-02-05 NOTE — REVIEW OF SYSTEMS
[Recent Wt Gain (___ Lbs)] : ~T recent [unfilled] lb weight gain [Postnasal Drip] : postnasal drip [Thyroid Problem] : thyroid problem [Fever] : no fever [Fatigue] : no fatigue [EDS] : no eds [Recent Wt Loss (___ Lbs)] : ~T no recent weight loss [Dry Eyes] : no dry eyes [Sore Throat] : no sore throat [Nasal Congestion] : no nasal congestion [Sinus Problems] : no sinus problems [Cough] : no cough [Hemoptysis] : no hemoptysis [Sputum] : no sputum [Dyspnea] : no dyspnea [Pleuritic Pain] : no pleuritic pain [Wheezing] : no wheezing [SOB on Exertion] : no sob on exertion [Chest Discomfort] : no chest discomfort [Edema] : no edema [Orthopnea] : no orthopnea [Watery Eyes] : no watery eyes [Itchy Eyes] : no itchy eyes [GERD] : no gerd [Abdominal Pain] : no abdominal pain [Nausea] : no nausea [Vomiting] : no vomiting [Nocturia] : no nocturia [Arthralgias] : no arthralgias [Myalgias] : no myalgias [Chronic Pain] : no chronic pain [Rash] : no rash [Headache] : no headache [Focal Weakness] : no focal weakness [Seizures] : no seizures [Head Injury] : no head injury [Depression] : no depression [Anxiety] : no anxiety [Diabetes] : no diabetes

## 2024-02-05 NOTE — HISTORY OF PRESENT ILLNESS
[Never] : never [Difficulty Breathing During Exertion] : dyspnea on exertion [Feelings Of Weakness On Exertion] : exercise intolerance [Cough] : coughing [Wheezing] : wheezing [Nasal Passage Blockage (Stuffiness)] : edema [Nonspecific Pain, Swelling, And Stiffness] : chest pain [Fever] : fever [TextBox_4] : 33M pulmonary sarcoid, chronic rhinitis/postnasal drip, and hypothyroidism presenting for initial pulmonary evaluation.  He is generally healthy and without any pulmonary symptoms. He is a never smoker. He has never had prior pulmonary function testing.  He tells me his symptoms started in 2020 when he was having a scratchy throat and the sensation of a fishbone stuck in his throat. Based on this he had evaluation and imaging which noted supraclavicular adenopathy. He was then sent for further imaging (c/a/p) and saw a hematologist. He was found to have mediastinal and abdominal adenopathy. He underwent an EBUS in July 2020 which came back with non-necrotizing granulomas from his subcarinal node and he was given the diagnosis of sarcoid.  He has not required any sarcoid directed therapies. He denies cough, sputum production, hemoptysis, fevers, night sweats, or weight loss. He has had blood work evaluating his ACE level and it has ranged from the 50s to 80s. He has not had any persistent renal dysfunction with most recent Cr 1.05 and has had normal calcium levels.  - He was followed by a physician at [facility] who recently left the practice he tells me and he is now presenting to establish care.  During this time he had weight gain and was found to have hypothyroidism initially with suspected myxedema. He was given steroids transiently at this time and is now managed on Levothyroxine.  He has not had any follow-up abdominal imaging or an echocardiogram. He does tell me that he sees an Ophthalmologist yearly and has not had any abnormalities related to sarcoid noted.  Bronchoscopy: 7/7/2020 - Dr. Adryan Martell - IMPRESSION: Mediastinal adenopathy. The airway examination was normal. AURELIA: preliminary cytology of the lesion in the subcarinal area was suggestive of non-necrotizing granulomatous tissue (final results are pending). EBUS Cytopathology: 7/7/2020 - INTERPRETATION: Negative for malignant cells. A few clusters of epitheliod histiocytes suggestive of granulomatous process noted on smears.   PFTs: 2/5/24 - FEV1 3.98L (74%), FVC 3.49L (80%), FEV1/FVC 88%, FEF 25-75 107%, TLC 75%, RV 72%, RV/TLC 25%, DLCO 64% [TextBox_12] : 6/19/2020 - Report Only - No pulmoanry mass or nodule seen. Enlarged superior mediastinal LN in a prevascular location measuring 2.7 cm in length and 1.5 cm in short axis. Enlarged R paratracheal LN. Enlarged subcarinal LN. Enlarged hilar LN. There is no evidence of rib fractures. no suspicous osseous lesion is seen. A 1.5 cm nodule within or adjacent to SMA origin is seen measuring up to 1.5 cm in diameter which likely represents additional LN [TextBox_27] : 1/26/21 - [facility] Report Only - Multiple small and mildly enlarged LN are identified throughout the mediastinum and bilateral hilar regions including 19x8 mm upper R paratrachea, 26x19 mm mid R paratracheal, 26x155 mm prevasular, 28x17 mm sucarinal, 61p44em R hilar, and 16x1 mm L hilar. There is no pleural effusion or pneumothorax, and no pleural thickening or discrete plaque identified. There is NO emphysema, bronchiectasis, or interstitial disease. Minimal dependent subsegmental atelectasis is present in the lower lobes bilaterally. Severe discrete perivascular nodules are identified in the [place], the largest measuring just over 4 mm. The remaining nodules measure less than 4 mm. These nodules are new or have increased in number since 6/19/2020.

## 2024-02-05 NOTE — ASSESSMENT
[FreeTextEntry1] : 33M pulmonary sarcoid, chronic rhinitis/postnasal drip, and hypothyroidism presenting for initial pulmonary evaluation.  #Sarcoidosis - pathology reports noted - Patient without pulmonary symptoms at this point and reasonable to monitor off therapy - PFTs reviewed - mild restriction with mild reduction in diffusion capacity - repeat in 6-12 months - Recommend CT chest for further evaluation as last reported CT provided to me is from 2021 and I do not have any images for review - Recommend CT abdomen/pelvis with IV contrast (Cr 1.05) given reported 1.5 cm nodule vs LN noted near SMA on CT report - Recommend echo for further evaluation to ensure no evidence of cardiac disease  #Health Maintenance Spirometry: Reviewed  Smoking status: Never smoker  Pneumococcal vaccination status: N/A  [place] Sleepiness Scale: Not a sleep visit  #Follow-up in 3-4 months or sooner as needed - All questions answered - Time spent on visit includes time spent in review of records provided by patient on day of visit which will be scanned into AEHR  The above plan was discussed with HARVINDER DE LA TORRE  in detail. Patient verbalized understanding and agrees with plan as detailed above. Patient was provided education and counselling on current diagnosis/symptoms, diagnostic work up, treatment options and potential side effects of any prescribed therapy/therapies. HARVINDER  was advised to call our clinic at [phone] for any new or worsening symptoms, or with any questions or concerns. In case of acute onset of respiratory symptoms or worsening presentation, patient was advised to present to nearest emergency room for further evaluation. HARVINDER  expressed understanding and all questions/concerns were addressed.

## 2024-03-04 ENCOUNTER — APPOINTMENT (OUTPATIENT)
Dept: CARDIOLOGY | Facility: CLINIC | Age: 34
End: 2024-03-04

## 2024-05-21 ENCOUNTER — APPOINTMENT (OUTPATIENT)
Dept: DERMATOLOGY | Facility: CLINIC | Age: 34
End: 2024-05-21

## 2024-09-19 ENCOUNTER — APPOINTMENT (OUTPATIENT)
Age: 34
End: 2024-09-19

## 2024-09-25 ENCOUNTER — APPOINTMENT (OUTPATIENT)
Dept: ORTHOPEDIC SURGERY | Facility: CLINIC | Age: 34
End: 2024-09-25

## 2024-09-26 ENCOUNTER — APPOINTMENT (OUTPATIENT)
Dept: ORTHOPEDIC SURGERY | Facility: CLINIC | Age: 34
End: 2024-09-26
Payer: COMMERCIAL

## 2024-09-26 DIAGNOSIS — S63.634D SPRAIN OF INTERPHALANGEAL JOINT OF RIGHT RING FINGER, SUBSEQUENT ENCOUNTER: ICD-10-CM

## 2024-09-26 PROCEDURE — 73140 X-RAY EXAM OF FINGER(S): CPT | Mod: F8

## 2024-09-26 PROCEDURE — 99203 OFFICE O/P NEW LOW 30 MIN: CPT
